# Patient Record
Sex: FEMALE | Race: WHITE | NOT HISPANIC OR LATINO | URBAN - METROPOLITAN AREA
[De-identification: names, ages, dates, MRNs, and addresses within clinical notes are randomized per-mention and may not be internally consistent; named-entity substitution may affect disease eponyms.]

---

## 2018-07-03 ENCOUNTER — OFFICE VISIT (OUTPATIENT)
Dept: URBAN - METROPOLITAN AREA CLINIC 32 | Facility: CLINIC | Age: 73
End: 2018-07-03
Payer: MEDICARE

## 2018-07-03 DIAGNOSIS — H25.10 AGE-RELATED NUCLEAR CATARACT, UNSPECIFIED EYE: Primary | ICD-10-CM

## 2018-07-03 PROCEDURE — 92014 COMPRE OPH EXAM EST PT 1/>: CPT | Performed by: OPTOMETRIST

## 2018-07-03 RX ORDER — CYCLOSPORINE 0.5 MG/ML
0.05 % EMULSION OPHTHALMIC
Qty: 5.5 | Refills: 3 | Status: INACTIVE
Start: 2018-07-03 | End: 2018-09-30

## 2018-07-03 ASSESSMENT — VISUAL ACUITY
OS: 20/20
OD: 20/20

## 2018-07-03 ASSESSMENT — INTRAOCULAR PRESSURE
OD: 12
OS: 13

## 2018-07-03 NOTE — IMPRESSION/PLAN
Impression: Diagnosis: Age-related nuclear cataract, unspecified eye. Code: H25.10.  Plan: monitor yearly

## 2018-07-03 NOTE — IMPRESSION/PLAN
Impression: Diagnosis: Dry eye syndrome of bilateral lacrimal glands. Code: J08.738.  Plan: continue AT PRN start restasis BID OU alrex BID OU RTC 2 months

## 2019-08-16 ENCOUNTER — OFFICE VISIT (OUTPATIENT)
Dept: URBAN - METROPOLITAN AREA CLINIC 32 | Facility: CLINIC | Age: 74
End: 2019-08-16
Payer: MEDICARE

## 2019-08-16 PROCEDURE — 92014 COMPRE OPH EXAM EST PT 1/>: CPT | Performed by: OPTOMETRIST

## 2019-08-16 ASSESSMENT — INTRAOCULAR PRESSURE
OS: 18
OD: 19

## 2019-08-16 ASSESSMENT — KERATOMETRY
OS: 41.75
OD: 42.87

## 2019-08-16 NOTE — IMPRESSION/PLAN
Impression: Diagnosis: Dry eye syndrome of bilateral lacrimal glands. Code: O13.631.  Plan: continue AT PRN start restasis BID OU alrex BID OU RTC 2 months

## 2021-05-14 ENCOUNTER — OFFICE VISIT (OUTPATIENT)
Dept: URBAN - METROPOLITAN AREA CLINIC 32 | Facility: CLINIC | Age: 76
End: 2021-05-14
Payer: COMMERCIAL

## 2021-05-14 DIAGNOSIS — H04.123 DRY EYE SYNDROME OF BILATERAL LACRIMAL GLANDS: ICD-10-CM

## 2021-05-14 DIAGNOSIS — H52.4 PRESBYOPIA: ICD-10-CM

## 2021-05-14 PROCEDURE — 99214 OFFICE O/P EST MOD 30 MIN: CPT | Performed by: OPTOMETRIST

## 2021-05-14 PROCEDURE — 92014 COMPRE OPH EXAM EST PT 1/>: CPT | Performed by: OPTOMETRIST

## 2021-05-14 ASSESSMENT — INTRAOCULAR PRESSURE
OD: 17
OS: 16

## 2021-05-14 ASSESSMENT — KERATOMETRY: OD: 42.88

## 2021-05-14 ASSESSMENT — VISUAL ACUITY
OD: 20/20
OS: 20/30

## 2021-05-14 NOTE — IMPRESSION/PLAN
Impression: Diagnosis: Dry eye syndrome of bilateral lacrimal glands. Code: G74.848.  Plan: continue AT PRN start restasis BID OU alrex BID OU RTC 2 months

## 2023-04-07 ENCOUNTER — OFFICE VISIT (OUTPATIENT)
Dept: URBAN - METROPOLITAN AREA CLINIC 32 | Facility: CLINIC | Age: 78
End: 2023-04-07
Payer: MEDICARE

## 2023-04-07 DIAGNOSIS — H04.123 DRY EYE SYNDROME OF BILATERAL LACRIMAL GLANDS: ICD-10-CM

## 2023-04-07 DIAGNOSIS — H16.223 KERATOCONJUNCTIVITIS SICCA, NOT SPECIFIED AS SJÖGREN'S, BILATERAL: ICD-10-CM

## 2023-04-07 DIAGNOSIS — H52.4 PRESBYOPIA: ICD-10-CM

## 2023-04-07 DIAGNOSIS — H25.13 AGE-RELATED NUCLEAR CATARACT, BILATERAL: Primary | ICD-10-CM

## 2023-04-07 PROCEDURE — 99214 OFFICE O/P EST MOD 30 MIN: CPT | Performed by: OPTOMETRIST

## 2023-04-07 RX ORDER — CYCLOSPORINE 0.5 MG/ML
0.05 % EMULSION OPHTHALMIC
Qty: 180 | Refills: 3 | Status: INACTIVE
Start: 2023-04-07 | End: 2023-07-05

## 2023-04-07 ASSESSMENT — INTRAOCULAR PRESSURE
OS: 16
OD: 14

## 2023-04-07 ASSESSMENT — VISUAL ACUITY
OD: 20/20
OS: 20/30